# Patient Record
Sex: FEMALE | Race: WHITE | Employment: OTHER | URBAN - NONMETROPOLITAN AREA
[De-identification: names, ages, dates, MRNs, and addresses within clinical notes are randomized per-mention and may not be internally consistent; named-entity substitution may affect disease eponyms.]

---

## 2018-06-29 ENCOUNTER — HOSPITAL ENCOUNTER (OUTPATIENT)
Dept: GENERAL RADIOLOGY | Age: 43
Discharge: HOME | End: 2018-06-29
Payer: MEDICARE

## 2018-06-29 ENCOUNTER — HOSPITAL ENCOUNTER (OUTPATIENT)
Age: 43
Discharge: HOME | End: 2018-06-29
Payer: MEDICARE

## 2018-06-29 DIAGNOSIS — R52 PAIN: ICD-10-CM

## 2018-06-29 LAB
BILIRUBIN URINE: NEGATIVE
BLOOD, URINE: NEGATIVE
CHARACTER, URINE: NORMAL
COLOR: YELLOW
GLUCOSE, URINE: NEGATIVE MG/DL
KETONES, URINE: NEGATIVE
LEUKOCYTE ESTERASE, URINE: NEGATIVE
NITRITE, URINE: NEGATIVE
PH UA: 7 (ref 5–9)
PROTEIN UA: NEGATIVE MG/DL
REFLEX TO URINE C & S: NORMAL
SPECIFIC GRAVITY UA: 1.01 (ref 1–1.03)
UROBILINOGEN, URINE: 0.2 EU/DL (ref 0–1)

## 2018-06-29 PROCEDURE — 81001 URINALYSIS AUTO W/SCOPE: CPT

## 2018-06-29 PROCEDURE — 74018 RADEX ABDOMEN 1 VIEW: CPT

## 2018-08-29 ENCOUNTER — APPOINTMENT (OUTPATIENT)
Dept: GENERAL RADIOLOGY | Age: 43
End: 2018-08-29
Payer: MEDICARE

## 2018-08-29 ENCOUNTER — HOSPITAL ENCOUNTER (EMERGENCY)
Age: 43
Discharge: HOME | End: 2018-08-29
Payer: MEDICARE

## 2018-08-29 VITALS
OXYGEN SATURATION: 95 % | RESPIRATION RATE: 18 BRPM | DIASTOLIC BLOOD PRESSURE: 75 MMHG | TEMPERATURE: 99.2 F | HEART RATE: 80 BPM | BODY MASS INDEX: 21.82 KG/M2 | HEIGHT: 67 IN | WEIGHT: 139 LBS | SYSTOLIC BLOOD PRESSURE: 104 MMHG

## 2018-08-29 DIAGNOSIS — G89.29 CHRONIC GENERALIZED ABDOMINAL PAIN: Primary | ICD-10-CM

## 2018-08-29 DIAGNOSIS — R10.84 CHRONIC GENERALIZED ABDOMINAL PAIN: Primary | ICD-10-CM

## 2018-08-29 LAB
ALBUMIN SERPL-MCNC: 4.3 G/DL (ref 3.5–5.1)
ALP BLD-CCNC: 151 U/L (ref 38–126)
ALT SERPL-CCNC: 13 U/L (ref 11–66)
AMPHETAMINE+METHAMPHETAMINE URINE SCREEN: NEGATIVE
ANION GAP SERPL CALCULATED.3IONS-SCNC: 14 MEQ/L (ref 8–16)
AST SERPL-CCNC: 12 U/L (ref 5–40)
BACTERIA: ABNORMAL /HPF
BARBITURATE QUANTITATIVE URINE: NEGATIVE
BASOPHILS # BLD: 0.7 %
BASOPHILS ABSOLUTE: 0 THOU/MM3 (ref 0–0.1)
BENZODIAZEPINE QUANTITATIVE URINE: POSITIVE
BILIRUB SERPL-MCNC: 0.4 MG/DL (ref 0.3–1.2)
BILIRUBIN DIRECT: < 0.2 MG/DL (ref 0–0.3)
BILIRUBIN URINE: ABNORMAL
BLOOD, URINE: NEGATIVE
BUN BLDV-MCNC: 7 MG/DL (ref 7–22)
CALCIUM SERPL-MCNC: 8.8 MG/DL (ref 8.5–10.5)
CANNABINOID QUANTITATIVE URINE: POSITIVE
CASTS 2: ABNORMAL /LPF
CASTS UA: ABNORMAL /LPF
CHARACTER, URINE: ABNORMAL
CHLORIDE BLD-SCNC: 100 MEQ/L (ref 98–111)
CO2: 22 MEQ/L (ref 23–33)
COCAINE METABOLITE QUANTITATIVE URINE: NEGATIVE
COLOR: ABNORMAL
CREAT SERPL-MCNC: 1 MG/DL (ref 0.4–1.2)
CRYSTALS, UA: ABNORMAL
EOSINOPHIL # BLD: 1.3 %
EOSINOPHILS ABSOLUTE: 0.1 THOU/MM3 (ref 0–0.4)
EPITHELIAL CELLS, UA: ABNORMAL /HPF
ERYTHROCYTE [DISTWIDTH] IN BLOOD BY AUTOMATED COUNT: 14.5 % (ref 11.5–14.5)
ERYTHROCYTE [DISTWIDTH] IN BLOOD BY AUTOMATED COUNT: 46.5 FL (ref 35–45)
GFR SERPL CREATININE-BSD FRML MDRD: 60 ML/MIN/1.73M2
GLUCOSE BLD-MCNC: 95 MG/DL (ref 70–108)
GLUCOSE URINE: NEGATIVE MG/DL
HCT VFR BLD CALC: 43 % (ref 37–47)
HEMOGLOBIN: 14.8 GM/DL (ref 12–16)
ICTOTEST: NEGATIVE
IMMATURE GRANS (ABS): 0.02 THOU/MM3 (ref 0–0.07)
IMMATURE GRANULOCYTES: 0.3 %
KETONES, URINE: NEGATIVE
LEUKOCYTE ESTERASE, URINE: ABNORMAL
LIPASE: 9 U/L (ref 5.6–51.3)
LYMPHOCYTES # BLD: 34.7 %
LYMPHOCYTES ABSOLUTE: 2.3 THOU/MM3 (ref 1–4.8)
MCH RBC QN AUTO: 30.7 PG (ref 26–33)
MCHC RBC AUTO-ENTMCNC: 34.4 GM/DL (ref 32.2–35.5)
MCV RBC AUTO: 89.2 FL (ref 81–99)
MISCELLANEOUS 2: ABNORMAL
MONOCYTES # BLD: 5.7 %
MONOCYTES ABSOLUTE: 0.4 THOU/MM3 (ref 0.4–1.3)
NITRITE, URINE: NEGATIVE
NUCLEATED RED BLOOD CELLS: 0 /100 WBC
OPIATES, URINE: NEGATIVE
OSMOLALITY CALCULATION: 269.7 MOSMOL/KG (ref 275–300)
OXYCODONE: NEGATIVE
PH UA: 5.5
PHENCYCLIDINE QUANTITATIVE URINE: NEGATIVE
PLATELET # BLD: 241 THOU/MM3 (ref 130–400)
PMV BLD AUTO: 9.6 FL (ref 9.4–12.4)
POTASSIUM SERPL-SCNC: 3.6 MEQ/L (ref 3.5–5.2)
PROTEIN UA: ABNORMAL
RBC # BLD: 4.82 MILL/MM3 (ref 4.2–5.4)
RBC URINE: ABNORMAL /HPF
RENAL EPITHELIAL, UA: ABNORMAL
SEG NEUTROPHILS: 57.3 %
SEGMENTED NEUTROPHILS ABSOLUTE COUNT: 3.8 THOU/MM3 (ref 1.8–7.7)
SODIUM BLD-SCNC: 136 MEQ/L (ref 135–145)
SPECIFIC GRAVITY, URINE: 1.03 (ref 1–1.03)
TOTAL PROTEIN: 7.2 G/DL (ref 6.1–8)
UROBILINOGEN, URINE: 1 EU/DL
WBC # BLD: 6.7 THOU/MM3 (ref 4.8–10.8)
WBC UA: ABNORMAL /HPF
YEAST: ABNORMAL

## 2018-08-29 PROCEDURE — 87077 CULTURE AEROBIC IDENTIFY: CPT

## 2018-08-29 PROCEDURE — 83690 ASSAY OF LIPASE: CPT

## 2018-08-29 PROCEDURE — 36415 COLL VENOUS BLD VENIPUNCTURE: CPT

## 2018-08-29 PROCEDURE — 87184 SC STD DISK METHOD PER PLATE: CPT

## 2018-08-29 PROCEDURE — 74018 RADEX ABDOMEN 1 VIEW: CPT

## 2018-08-29 PROCEDURE — 82248 BILIRUBIN DIRECT: CPT

## 2018-08-29 PROCEDURE — 99284 EMERGENCY DEPT VISIT MOD MDM: CPT

## 2018-08-29 PROCEDURE — 6360000002 HC RX W HCPCS: Performed by: EMERGENCY MEDICINE

## 2018-08-29 PROCEDURE — 80053 COMPREHEN METABOLIC PANEL: CPT

## 2018-08-29 PROCEDURE — 87186 SC STD MICRODIL/AGAR DIL: CPT

## 2018-08-29 PROCEDURE — 96374 THER/PROPH/DIAG INJ IV PUSH: CPT

## 2018-08-29 PROCEDURE — 87086 URINE CULTURE/COLONY COUNT: CPT

## 2018-08-29 PROCEDURE — 81001 URINALYSIS AUTO W/SCOPE: CPT

## 2018-08-29 PROCEDURE — 85025 COMPLETE CBC W/AUTO DIFF WBC: CPT

## 2018-08-29 PROCEDURE — 80307 DRUG TEST PRSMV CHEM ANLYZR: CPT

## 2018-08-29 RX ORDER — MONTELUKAST SODIUM 10 MG/1
10 TABLET ORAL NIGHTLY
COMMUNITY
End: 2020-09-24

## 2018-08-29 RX ORDER — ONDANSETRON 4 MG/1
4 TABLET, ORALLY DISINTEGRATING ORAL ONCE
Status: COMPLETED | OUTPATIENT
Start: 2018-08-29 | End: 2018-08-29

## 2018-08-29 RX ORDER — TIZANIDINE 4 MG/1
4 TABLET ORAL EVERY 6 HOURS PRN
COMMUNITY
End: 2020-09-24

## 2018-08-29 RX ORDER — NORTRIPTYLINE HYDROCHLORIDE 10 MG/5ML
SOLUTION ORAL NIGHTLY
COMMUNITY
End: 2020-12-30 | Stop reason: ALTCHOICE

## 2018-08-29 RX ORDER — FENTANYL CITRATE 50 UG/ML
100 INJECTION, SOLUTION INTRAMUSCULAR; INTRAVENOUS ONCE
Status: COMPLETED | OUTPATIENT
Start: 2018-08-29 | End: 2018-08-29

## 2018-08-29 RX ORDER — OMEPRAZOLE 20 MG/1
40 CAPSULE, DELAYED RELEASE ORAL DAILY
COMMUNITY
End: 2020-09-24

## 2018-08-29 RX ORDER — HYOSCYAMINE SULFATE 0.125 MG
125 TABLET ORAL EVERY 4 HOURS PRN
COMMUNITY
End: 2020-12-15 | Stop reason: SDUPTHER

## 2018-08-29 RX ADMIN — FENTANYL CITRATE 100 MCG: 50 INJECTION INTRAMUSCULAR; INTRAVENOUS at 18:14

## 2018-08-29 RX ADMIN — ONDANSETRON 4 MG: 4 TABLET, ORALLY DISINTEGRATING ORAL at 18:14

## 2018-08-29 ASSESSMENT — ENCOUNTER SYMPTOMS
DIARRHEA: 0
NAUSEA: 0
SHORTNESS OF BREATH: 0
VOMITING: 1
SORE THROAT: 0
EYE PAIN: 0
EYE DISCHARGE: 0
COUGH: 0
ABDOMINAL PAIN: 1
WHEEZING: 0
BACK PAIN: 0
RHINORRHEA: 0

## 2018-08-29 ASSESSMENT — PAIN DESCRIPTION - PAIN TYPE
TYPE: ACUTE PAIN
TYPE: ACUTE PAIN;CHRONIC PAIN

## 2018-08-29 ASSESSMENT — PAIN DESCRIPTION - LOCATION
LOCATION: ABDOMEN
LOCATION: ABDOMEN

## 2018-08-29 ASSESSMENT — PAIN DESCRIPTION - FREQUENCY: FREQUENCY: CONTINUOUS

## 2018-08-29 ASSESSMENT — PAIN DESCRIPTION - DESCRIPTORS: DESCRIPTORS: CRAMPING

## 2018-08-29 ASSESSMENT — PAIN SCALES - GENERAL
PAINLEVEL_OUTOF10: 9
PAINLEVEL_OUTOF10: 5
PAINLEVEL_OUTOF10: 8

## 2018-08-29 NOTE — ED TRIAGE NOTES
Patient presents to room 32 with complaints of abdominal pain and emesis over the past few days. Patient stated she was at her pain specialist in [facility] and was advised to go to the ED for further evaluation and treatment of symptoms. Patient stated she has a history of abdominal pain and had a colonoscopy yesterday.

## 2018-08-29 NOTE — ED PROVIDER NOTES
[facility]  eMERGENCY dEPARTMENT eNCOUnter          CHIEF COMPLAINT       Chief Complaint   Patient presents with    Abdominal Pain    Emesis       Nurses Notes reviewed and I agree except as noted in the HPI. HISTORY OF PRESENT ILLNESS    Cheryl Brewer is a 37 y.o. female who presents to the Emergency Department for the evaluation of abdominal pain and emesis. She reports that she has a history of Cyclic vomiting syndrome and gastroparesis for which she has been on pain management. She states that she is normally treated with Zofran, Phenergan, and Dilaudid. She also states that she had a colonoscopy yesterday by Dr. Gini Dunham (Nenita Ends had a few polyps removed. She states that this feels like her normal abdominal pain but worse. She rates her pain at a 8/10 in severity and cramping in character. She denies the use of drugs, alcohol, or tobacco. She denies any fevers, chills, chest pain, or shortness of breath. Patient denies any further complaints at initial encounter. The HPI was provided by the patient. REVIEW OF SYSTEMS     Review of Systems   Constitutional: Negative for appetite change, chills, fatigue and fever. HENT: Negative for congestion, ear pain, rhinorrhea and sore throat. Eyes: Negative for pain, discharge and visual disturbance. Respiratory: Negative for cough, shortness of breath and wheezing. Cardiovascular: Negative for chest pain, palpitations and leg swelling. Gastrointestinal: Positive for abdominal pain and vomiting. Negative for diarrhea and nausea. Genitourinary: Negative for difficulty urinating, dysuria, hematuria and vaginal discharge. Musculoskeletal: Negative for arthralgias, back pain, joint swelling and neck pain. Skin: Negative for pallor and rash. Neurological: Negative for dizziness, syncope, weakness, light-headedness, numbness and headaches. Hematological: Negative for adenopathy.    Psychiatric/Behavioral: Negative for confusion and suicidal ideas. The patient is not nervous/anxious. PAST MEDICAL HISTORY    has a past medical history of Asthma and CVS disease. SURGICAL HISTORY      has a past surgical history that includes Cholecystectomy; Hysterectomy; and Appendectomy. CURRENT MEDICATIONS       Discharge Medication List as of 8/29/2018  8:18 PM      CONTINUE these medications which have NOT CHANGED    Details   tiZANidine (ZANAFLEX) 4 MG tablet Take 4 mg by mouth every 6 hours as neededHistorical Med      tiotropium (SPIRIVA) 18 MCG inhalation capsule Inhale 18 mcg into the lungs dailyHistorical Med      montelukast (SINGULAIR) 10 MG tablet Take 10 mg by mouth nightlyHistorical Med      omeprazole (PRILOSEC) 20 MG delayed release capsule Take 40 mg by mouth dailyHistorical Med      nortriptyline (PAMELOR) 10 MG/5ML solution Take by mouth nightlyHistorical Med      hyoscyamine (ANASPAZ;LEVSIN) 125 MCG tablet Take 125 mcg by mouth every 4 hours as needed for CrampingHistorical Med      clonazePAM (KLONOPIN) 0.5 MG tablet Take 0.5 mg by mouth 2 times daily as needed      gabapentin (NEURONTIN) 600 MG tablet Take 800 mg by mouth 3 times daily      albuterol (PROVENTIL HFA;VENTOLIN HFA) 108 (90 BASE) MCG/ACT inhaler Inhale 2 puffs into the lungs every 6 hours as needed for Wheezing      promethazine (PROMETHEGAN) 25 MG suppository Place 1 suppository rectally every 6 hours as needed for Nausea, Disp-10 suppository, R-0      ondansetron (ZOFRAN-ODT) 8 MG disintegrating tablet Take 8 mg by mouth every 8 hours as needed for Nausea or Vomiting. loperamide (IMODIUM) 2 MG capsule Take 2 mg by mouth 4 times daily as needed for Diarrhea. ALLERGIES     is allergic to nubain [nalbuphine hcl]; pcn [penicillins]; morphine; and pneumococcal vaccines. FAMILY HISTORY     indicated that her mother is alive. She indicated that her father is alive. family history is not on file.     SOCIAL HISTORY      reports that she has

## 2018-09-01 LAB
ORGANISM: ABNORMAL
URINE CULTURE REFLEX: ABNORMAL

## 2020-12-15 PROBLEM — R11.15 CYCLICAL VOMITING SYNDROME: Status: ACTIVE | Noted: 2020-12-15

## 2021-03-17 ENCOUNTER — ANESTHESIA EVENT (OUTPATIENT)
Dept: ENDOSCOPY | Age: 46
End: 2021-03-17
Payer: MEDICARE

## 2021-03-17 ENCOUNTER — ANESTHESIA (OUTPATIENT)
Dept: ENDOSCOPY | Age: 46
End: 2021-03-17
Payer: MEDICARE

## 2021-03-17 ENCOUNTER — HOSPITAL ENCOUNTER (OUTPATIENT)
Age: 46
Setting detail: OUTPATIENT SURGERY
Discharge: HOME | End: 2021-03-17
Attending: INTERNAL MEDICINE | Admitting: INTERNAL MEDICINE
Payer: MEDICARE

## 2021-03-17 VITALS
HEIGHT: 68 IN | HEART RATE: 84 BPM | RESPIRATION RATE: 18 BRPM | DIASTOLIC BLOOD PRESSURE: 84 MMHG | BODY MASS INDEX: 24.63 KG/M2 | SYSTOLIC BLOOD PRESSURE: 118 MMHG | OXYGEN SATURATION: 97 % | TEMPERATURE: 97.4 F

## 2021-03-17 VITALS
SYSTOLIC BLOOD PRESSURE: 123 MMHG | RESPIRATION RATE: 23 BRPM | OXYGEN SATURATION: 100 % | DIASTOLIC BLOOD PRESSURE: 78 MMHG

## 2021-03-17 PROCEDURE — 6360000002 HC RX W HCPCS: Performed by: INTERNAL MEDICINE

## 2021-03-17 PROCEDURE — 2580000003 HC RX 258: Performed by: INTERNAL MEDICINE

## 2021-03-17 PROCEDURE — 7100000011 HC PHASE II RECOVERY - ADDTL 15 MIN: Performed by: INTERNAL MEDICINE

## 2021-03-17 PROCEDURE — 6360000002 HC RX W HCPCS: Performed by: NURSE ANESTHETIST, CERTIFIED REGISTERED

## 2021-03-17 PROCEDURE — 2709999900 HC NON-CHARGEABLE SUPPLY: Performed by: INTERNAL MEDICINE

## 2021-03-17 PROCEDURE — 2500000003 HC RX 250 WO HCPCS: Performed by: NURSE ANESTHETIST, CERTIFIED REGISTERED

## 2021-03-17 PROCEDURE — 7100000010 HC PHASE II RECOVERY - FIRST 15 MIN: Performed by: INTERNAL MEDICINE

## 2021-03-17 PROCEDURE — 3609017100 HC EGD: Performed by: INTERNAL MEDICINE

## 2021-03-17 PROCEDURE — 3700000001 HC ADD 15 MINUTES (ANESTHESIA): Performed by: INTERNAL MEDICINE

## 2021-03-17 PROCEDURE — 3700000000 HC ANESTHESIA ATTENDED CARE: Performed by: INTERNAL MEDICINE

## 2021-03-17 RX ORDER — SODIUM CHLORIDE 450 MG/100ML
INJECTION, SOLUTION INTRAVENOUS CONTINUOUS
Status: DISCONTINUED | OUTPATIENT
Start: 2021-03-17 | End: 2021-03-17 | Stop reason: HOSPADM

## 2021-03-17 RX ORDER — SODIUM CHLORIDE 0.9 % (FLUSH) 0.9 %
10 SYRINGE (ML) INJECTION PRN
Status: DISCONTINUED | OUTPATIENT
Start: 2021-03-17 | End: 2021-03-17 | Stop reason: HOSPADM

## 2021-03-17 RX ORDER — PROPOFOL 10 MG/ML
INJECTION, EMULSION INTRAVENOUS PRN
Status: DISCONTINUED | OUTPATIENT
Start: 2021-03-17 | End: 2021-03-17 | Stop reason: SDUPTHER

## 2021-03-17 RX ORDER — VANCOMYCIN HYDROCHLORIDE 1 G/20ML
1000 INJECTION, POWDER, LYOPHILIZED, FOR SOLUTION INTRAVENOUS ONCE
Status: DISCONTINUED | OUTPATIENT
Start: 2021-03-17 | End: 2021-03-17 | Stop reason: SDUPTHER

## 2021-03-17 RX ORDER — LIDOCAINE HYDROCHLORIDE 20 MG/ML
INJECTION, SOLUTION INFILTRATION; PERINEURAL PRN
Status: DISCONTINUED | OUTPATIENT
Start: 2021-03-17 | End: 2021-03-17 | Stop reason: SDUPTHER

## 2021-03-17 RX ADMIN — SODIUM CHLORIDE: 4.5 INJECTION, SOLUTION INTRAVENOUS at 08:41

## 2021-03-17 RX ADMIN — VANCOMYCIN HYDROCHLORIDE 1000 MG: 1 INJECTION, POWDER, LYOPHILIZED, FOR SOLUTION INTRAVENOUS at 08:41

## 2021-03-17 RX ADMIN — LIDOCAINE HYDROCHLORIDE 100 MG: 20 INJECTION, SOLUTION INFILTRATION; PERINEURAL at 09:15

## 2021-03-17 RX ADMIN — VANCOMYCIN HYDROCHLORIDE 1000 MG: 1 INJECTION, POWDER, LYOPHILIZED, FOR SOLUTION INTRAVENOUS at 08:44

## 2021-03-17 RX ADMIN — PROPOFOL 180 MG: 10 INJECTION, EMULSION INTRAVENOUS at 09:15

## 2021-03-17 ASSESSMENT — PAIN - FUNCTIONAL ASSESSMENT: PAIN_FUNCTIONAL_ASSESSMENT: 0-10

## 2021-03-17 NOTE — PROGRESS NOTES
Recovery mode, denies discomfort. Taking fluids. Dr. MATT Singh 106 discussed findings with pt and . Discharge instructions provided and understanding verbalized.

## 2021-03-17 NOTE — PRE SEDATION
Sedation/Analgesia History & Physical    Patient: Darryl Medina : 1975  Med Rec#: [id number] Acc#: [id number]   Provider Performing Procedure: Jonas Moraes  Primary Care Physician: CLIFTON Hayward    PRE-PROCEDURE   Full CODE [x]Yes  DNR-CCA/DNR-CC []Yes   Brief History/Pre-Procedure Diagnosis:chronic GI bleeding           MEDICAL HISTORY    []Additional information:       has a past medical history of Anxiety, Asthma, Chronic cough, COPD (chronic obstructive pulmonary disease) ([facility] [address] CVS disease, Depression, Emphysema lung ([facility] [address] Migraines, SOB (shortness of breath), and Tattoos. SOCIAL HISTORY  Social History     Tobacco History     Smoking Status  Current Every Day Smoker Smoking Start Date  1988 Smoking Frequency  0.25 packs/day Smoking Tobacco Type  Cigarettes    Smokeless Tobacco Use  Never Used    Tobacco Comment  30 yrs  quit 4 yrs ago          Alcohol History     Alcohol Use Status  No          Drug Use     Drug Use Status  No          Sexual Activity     Sexually Active  Yes Partners  Male                FAMILY HISTORY     Family History   Problem Relation Age of Onset    Colon Cancer Neg Hx     Colon Polyps Neg Hx        SURGICAL HISTORY   has a past surgical history that includes Cholecystectomy; Hysterectomy; and Appendectomy.   Additional information:       ALLERGIES   Allergies as of 2021 - Review Complete 2021   Allergen Reaction Noted    Nubain [nalbuphine hcl] Anaphylaxis 2014    Pcn [penicillins] Anaphylaxis 2014    Morphine  2018    Pneumococcal vaccines  2018     Additional information:       MEDICATIONS   Coumadin Use Last 7 Days [x]No []Yes  Antiplatelet drug therapy use last 7 days  [x]No []Yes  Other anticoagulant use last 7 days  [x]No []Yes    Current Facility-Administered Medications:     sodium chloride flush 0.9 % injection 10 mL, 10 mL, Intravenous, PRN, Jonas Moraes MD    0.45 % sodium chloride infusion, , Intravenous, Continuous, Jack Villalpando MD, Last Rate: 75 mL/hr at 03/17/21 0841, New Bag at 03/17/21 0841    vancomycin 1000 mg IVPB in 250 mL D5W addavial, 1,000 mg, Intravenous, Once, Jack Villalpando MD, Last Rate: 250 mL/hr at 03/17/21 0841, 1,000 mg at 03/17/21 0841  Prior to Admission medications    Medication Sig Start Date End Date Taking? Authorizing Provider   SYMBICORT 80-4.5 MCG/ACT AERO inhale 2 puffs by mouth twice a day 1/26/21  Yes Historical Provider, MD   SPIRIVA RESPIMAT 2.5 MCG/ACT AERS inhaler inhale 2 puffs by mouth and INTO THE LUNGS once daily 1/26/21  Yes Historical Provider, MD   nortriptyline (PAMELOR) 25 MG capsule Take 3 capsules by mouth nightly 12/30/20 3/30/21 Yes CLIFTON Sanchez CNP   ondansetron (ZOFRAN) 4 MG tablet  12/9/20  Yes Historical Provider, MD   benztropine (COGENTIN) 1 MG tablet Take 1 mg by mouth 2 times daily   Yes Historical Provider, MD   folic acid (FOLVITE) 1 MG tablet Take 1 mg by mouth daily   Yes Historical Provider, MD   guaiFENesin (MUCINEX PO) Take by mouth   Yes Historical Provider, MD   clonazePAM (KLONOPIN) 0.5 MG tablet Take 0.5 mg by mouth 2 times daily as needed 7/21/15  Yes Historical Provider, MD   albuterol (PROVENTIL HFA;VENTOLIN HFA) 108 (90 BASE) MCG/ACT inhaler Inhale 2 puffs into the lungs every 6 hours as needed for Wheezing   Yes Historical Provider, MD   chlorhexidine (PERIDEX) 0.12 % solution Rinse with 1/2 ounce by mouth for 30 seconds then spit out.  use twice a day 2/14/21   Historical Provider, MD   nortriptyline (PAMELOR) 10 MG capsule Take 1 capsule by mouth daily 12/30/20   CLIFTON Sanchez CNP   hyoscyamine (ANASPAZ;LEVSIN) 125 MCG tablet Take 1 tablet by mouth every 4 hours as needed for Cramping or Diarrhea 12/15/20 3/15/21  Olamide Longo APRN - CNP   polyethylene glycol (MIRALAX) 17 g PACK packet Take 17 g by mouth as needed Pt using 1/2 cap as needed    Historical Provider, MD   aspirin 81 MG EC tablet Take 81 mg by mouth daily    Historical Provider, MD   warfarin (COUMADIN) 7.5 MG tablet Take 7.5 mg by mouth 10 mg on Sun & Wed  15 mg the rest of the week    Historical Provider, MD   gabapentin (NEURONTIN) 600 MG tablet Take 800 mg by mouth 3 times daily 6/14/15   Historical Provider, MD     Additional information:       PHYSICAL:   Heart:  [x]Regular rate and rhythm  []Other:    Lungs:  [x]Clear    []Other:    Abdomen: [x]Soft    []Other:    Mental Status: [x]Alert & Oriented  []Other:        PLANNED PROCEDURE   [x]EGD  []Colonoscopy []Flex Sigmoid     Consent: I have discussed with the patient and/or the patient representative the indication, alternatives, and the possible risks and/or complications of the planned procedure and the anesthesia methods. The patient and/or patient representative appear to understand and agree to proceed. SEDATION  Please see anesthesia note. The medication Planned :  Planned agent:[x]Midazolam []Meperidine [x]Sublimaze []Morphine  []Diazepam  [x]Propofol     Airway Assessment:   See anesthesia no please     Monitoring and Safety: The patient will be placed on a cardiac monitor and vital signs, pulse oximetry and level of consciousness will be continuously evaluated throughout the procedure. The patient will be closely monitored until recovery from the medications is complete and the patient has returned to baseline status. Respiratory therapy will be on standby during the procedure. [x]Pre-procedure diagnostic studies complete and results available. Comment:    [x]Previous sedation/anesthesia experiences assessed. Comment:    [x]The patient is an appropriate candidate to undergo the planned procedure sedation and anesthesia. (Refer to nursing sedation/analgesia documentation record)  [x]Formulation and discussion of sedation/procedure plan, risks, and expectations with patient and/or responsible adult completed. [x]Patient examined immediately prior to the procedure.  (Refer to nursing sedation/analgesia documentation record)    Rock Jerry MD   Electronically signed

## 2021-03-17 NOTE — ANESTHESIA PRE PROCEDURE
Department of Anesthesiology  Preprocedure Note       Name:  Dona Jason   Age:  39 y.o.  :  1975                                          MRN:  [id number]         Date:  3/17/2021      Surgeon: Moise Lange):  Wayne Arana MD    Procedure: Procedure(s):  EGD ESOPHAGOGASTRODUODENOSCOPY    Medications prior to admission:   Prior to Admission medications    Medication Sig Start Date End Date Taking? Authorizing Provider   SYMBICORT 80-4.5 MCG/ACT AERO inhale 2 puffs by mouth twice a day 21  Yes Historical Provider, MD   SPIRIVA RESPIMAT 2.5 MCG/ACT AERS inhaler inhale 2 puffs by mouth and INTO THE LUNGS once daily 21  Yes Historical Provider, MD   nortriptyline (PAMELOR) 25 MG capsule Take 3 capsules by mouth nightly 12/30/20 3/30/21 Yes Maggy Bar APRN - CNP   ondansetron (ZOFRAN) 4 MG tablet  20  Yes Historical Provider, MD   benztropine (COGENTIN) 1 MG tablet Take 1 mg by mouth 2 times daily   Yes Historical Provider, MD   folic acid (FOLVITE) 1 MG tablet Take 1 mg by mouth daily   Yes Historical Provider, MD   guaiFENesin (MUCINEX PO) Take by mouth   Yes Historical Provider, MD   clonazePAM (KLONOPIN) 0.5 MG tablet Take 0.5 mg by mouth 2 times daily as needed 7/21/15  Yes Historical Provider, MD   albuterol (PROVENTIL HFA;VENTOLIN HFA) 108 (90 BASE) MCG/ACT inhaler Inhale 2 puffs into the lungs every 6 hours as needed for Wheezing   Yes Historical Provider, MD   chlorhexidine (PERIDEX) 0.12 % solution Rinse with 1/2 ounce by mouth for 30 seconds then spit out.  use twice a day 21   Historical Provider, MD   nortriptyline (PAMELOR) 10 MG capsule Take 1 capsule by mouth daily 20   Maggy Bar APRN - CNP   hyoscyamine (ANASPAZ;LEVSIN) 125 MCG tablet Take 1 tablet by mouth every 4 hours as needed for Cramping or Diarrhea 12/15/20 3/15/21  Maggy Bar APRN - CNP   polyethylene glycol (MIRALAX) 17 g PACK packet Take 17 g by mouth as needed Pt using 1/2 cap as needed Historical Provider, MD   aspirin 81 MG EC tablet Take 81 mg by mouth daily    Historical Provider, MD   warfarin (COUMADIN) 7.5 MG tablet Take 7.5 mg by mouth 10 mg on Sun & Wed  15 mg the rest of the week    Historical Provider, MD   gabapentin (NEURONTIN) 600 MG tablet Take 800 mg by mouth 3 times daily 6/14/15   Historical Provider, MD       Current medications:    Current Facility-Administered Medications   Medication Dose Route Frequency Provider Last Rate Last Admin    sodium chloride flush 0.9 % injection 10 mL  10 mL Intravenous PRN Varghese Nelson MD        0.45 % sodium chloride infusion   Intravenous Continuous Varghese Nelson MD 75 mL/hr at 03/17/21 0841 New Bag at 03/17/21 0841    vancomycin 1000 mg IVPB in 250 mL D5W addavial  1,000 mg Intravenous Once Varghese Nelson  mL/hr at 03/17/21 0841 1,000 mg at 03/17/21 0841       Allergies: Allergies   Allergen Reactions    Nubain [Nalbuphine Hcl] Anaphylaxis     Has received hydrocodone, oxycodone, hydromorphone in the past with no reaction    Pcn [Penicillins] Anaphylaxis    Morphine     Pneumococcal Vaccines        Problem List:    Patient Active Problem List   Diagnosis Code    Colitis E68.7    Cyclical vomiting syndrome R11.15       Past Medical History:        Diagnosis Date    Anxiety     Asthma     Chronic cough     [facility]     CVS disease     Depression     Emphysema lung (HCC)     Migraines     SOB (shortness of breath)     Tattoos        Past Surgical History:        Procedure Laterality Date    APPENDECTOMY      CHOLECYSTECTOMY      HYSTERECTOMY         Social History:    Social History     Tobacco Use    Smoking status: Current Every Day Smoker     Packs/day: 0.25     Types: Cigarettes     Start date: 1/17/1988    Smokeless tobacco: Never Used    Tobacco comment: 30 yrs  quit 4 yrs ago   Substance Use Topics    Alcohol use:  No                                Ready to quit: Yes  Counseling given: No  Comment: 30 yrs  quit 4 yrs ago      Vital Signs (Current):   Vitals:    03/17/21 0739   BP: 111/72   Pulse: 87   Temp: 37.3 °C (99.1 °F)   TempSrc: Temporal   SpO2: 95%   Height: 5' 8\" (1.727 m)                                              BP Readings from Last 3 Encounters:   03/17/21 111/72   03/02/21 118/82   02/02/21 126/84       NPO Status: Time of last liquid consumption: 2130                        Time of last solid consumption: 1815                        Date of last liquid consumption: 03/16/21                        Date of last solid food consumption: 03/16/21    BMI:   Wt Readings from Last 3 Encounters:   03/02/21 162 lb (73.5 kg)   02/02/21 156 lb (70.8 kg)   12/15/20 143 lb (64.9 kg)     Body mass index is 24.63 kg/m². CBC:   Lab Results   Component Value Date    WBC 6.7 08/29/2018    RBC 4.82 08/29/2018    HGB 14.8 08/29/2018    HCT 43.0 08/29/2018    MCV 89.2 08/29/2018    RDW 13.3 08/04/2015     08/29/2018       CMP:   Lab Results   Component Value Date     08/29/2018    K 3.6 08/29/2018     08/29/2018    CO2 22 08/29/2018    BUN 7 08/29/2018    CREATININE 1.0 08/29/2018    LABGLOM 60 08/29/2018    GLUCOSE 95 08/29/2018    PROT 7.2 08/29/2018    CALCIUM 8.8 08/29/2018    BILITOT 0.4 08/29/2018    ALKPHOS 151 08/29/2018    AST 12 08/29/2018    ALT 13 08/29/2018       POC Tests: No results for input(s): POCGLU, POCNA, POCK, POCCL, POCBUN, POCHEMO, POCHCT in the last 72 hours.     Coags:   Lab Results   Component Value Date    PROTIME 12.0 10/19/2020    INR 1.04 10/19/2020       HCG (If Applicable): No results found for: PREGTESTUR, PREGSERUM, HCG, HCGQUANT     ABGs: No results found for: PHART, PO2ART, HYV6HOX, BLJ3ALF, BEART, G9TZEGRK     Type & Screen (If Applicable):  No results found for: LABABO, LABRH    Drug/Infectious Status (If Applicable):  No results found for: HIV, HEPCAB    COVID-19 Screening (If Applicable): No results found for: COVID19 Anesthesia Evaluation  Patient summary reviewed and Nursing notes reviewed  Airway: Mallampati: II        Dental: normal exam         Pulmonary:normal exam                               Cardiovascular:    (+) valvular problems/murmurs: AS,                   Neuro/Psych:               GI/Hepatic/Renal:   (+) hiatal hernia,           Endo/Other: Negative Endo/Other ROS                    Abdominal:           Vascular:                                        Anesthesia Plan      MAC     ASA 3       Induction: intravenous. Anesthetic plan and risks discussed with patient. Plan discussed with CRNA.     Attending anesthesiologist reviewed and agrees with Pre Eval content              CLIFTON Keen - CRNA   3/17/2021

## 2021-03-17 NOTE — BRIEF OP NOTE
Brief Postoperative Note      Patient: Saundra Reilly  YOB: 1975  MRN: [id number]    Date of Procedure: 3/17/2021    Pre-Op Diagnosis: IRON DEFICIENCY ANEMIA, CYCLICAL VOMITING SYNDROME, PERIUMBILICAL ABDOMINAL PAIN    Post-Op Diagnosis:  Normal EGD        Procedure(s):  EGD ESOPHAGOGASTRODUODENOSCOPY    Surgeon(s):  Sheryl Drake MD    Assistant:  * No surgical staff found *    Anesthesia: Monitor Anesthesia Care    Estimated Blood Loss (mL): none    Complications: None    Specimens:   * No specimens in log *    Implants:  * No implants in log *      Drains: * No LDAs found *    Findings:  Normal EGD     Electronically signed by Sheryl Drake MD on 3/17/2021 at 9:31 AM

## 2021-03-17 NOTE — PROGRESS NOTES
Egd performed with scope #[id number], scope advanced without problem , patient tolerated well.  Specimens not taken

## 2021-11-17 ENCOUNTER — HOSPITAL ENCOUNTER (OUTPATIENT)
Age: 46
Setting detail: OUTPATIENT SURGERY
Discharge: HOME | End: 2021-11-17
Attending: INTERNAL MEDICINE | Admitting: INTERNAL MEDICINE
Payer: MEDICARE

## 2021-11-17 ENCOUNTER — ANESTHESIA (OUTPATIENT)
Dept: ENDOSCOPY | Age: 46
End: 2021-11-17
Payer: MEDICARE

## 2021-11-17 ENCOUNTER — ANESTHESIA EVENT (OUTPATIENT)
Dept: ENDOSCOPY | Age: 46
End: 2021-11-17
Payer: MEDICARE

## 2021-11-17 VITALS
BODY MASS INDEX: 21.98 KG/M2 | WEIGHT: 145 LBS | SYSTOLIC BLOOD PRESSURE: 109 MMHG | HEIGHT: 68 IN | OXYGEN SATURATION: 97 % | HEART RATE: 87 BPM | DIASTOLIC BLOOD PRESSURE: 66 MMHG | RESPIRATION RATE: 18 BRPM | TEMPERATURE: 96.4 F

## 2021-11-17 VITALS
SYSTOLIC BLOOD PRESSURE: 116 MMHG | OXYGEN SATURATION: 99 % | DIASTOLIC BLOOD PRESSURE: 68 MMHG | RESPIRATION RATE: 30 BRPM

## 2021-11-17 PROCEDURE — 7100000011 HC PHASE II RECOVERY - ADDTL 15 MIN: Performed by: INTERNAL MEDICINE

## 2021-11-17 PROCEDURE — 2580000003 HC RX 258: Performed by: INTERNAL MEDICINE

## 2021-11-17 PROCEDURE — 6360000002 HC RX W HCPCS: Performed by: NURSE ANESTHETIST, CERTIFIED REGISTERED

## 2021-11-17 PROCEDURE — 2720000010 HC SURG SUPPLY STERILE: Performed by: INTERNAL MEDICINE

## 2021-11-17 PROCEDURE — 3609017700 HC EGD DILATION GASTRIC/DUODENAL STRICTURE: Performed by: INTERNAL MEDICINE

## 2021-11-17 PROCEDURE — 3700000000 HC ANESTHESIA ATTENDED CARE: Performed by: INTERNAL MEDICINE

## 2021-11-17 PROCEDURE — 2500000003 HC RX 250 WO HCPCS: Performed by: NURSE ANESTHETIST, CERTIFIED REGISTERED

## 2021-11-17 PROCEDURE — 7100000010 HC PHASE II RECOVERY - FIRST 15 MIN: Performed by: INTERNAL MEDICINE

## 2021-11-17 PROCEDURE — 2500000003 HC RX 250 WO HCPCS: Performed by: INTERNAL MEDICINE

## 2021-11-17 RX ORDER — CLINDAMYCIN PHOSPHATE 600 MG/50ML
600 INJECTION INTRAVENOUS ONCE
Status: COMPLETED | OUTPATIENT
Start: 2021-11-17 | End: 2021-11-17

## 2021-11-17 RX ORDER — SODIUM CHLORIDE 0.9 % (FLUSH) 0.9 %
5-40 SYRINGE (ML) INJECTION EVERY 12 HOURS SCHEDULED
Status: DISCONTINUED | OUTPATIENT
Start: 2021-11-17 | End: 2021-11-17 | Stop reason: HOSPADM

## 2021-11-17 RX ORDER — LIDOCAINE HYDROCHLORIDE 10 MG/ML
INJECTION, SOLUTION INFILTRATION; PERINEURAL PRN
Status: DISCONTINUED | OUTPATIENT
Start: 2021-11-17 | End: 2021-11-17 | Stop reason: SDUPTHER

## 2021-11-17 RX ORDER — SODIUM CHLORIDE 0.9 % (FLUSH) 0.9 %
5-40 SYRINGE (ML) INJECTION PRN
Status: DISCONTINUED | OUTPATIENT
Start: 2021-11-17 | End: 2021-11-17 | Stop reason: HOSPADM

## 2021-11-17 RX ORDER — SODIUM CHLORIDE 9 MG/ML
25 INJECTION, SOLUTION INTRAVENOUS PRN
Status: DISCONTINUED | OUTPATIENT
Start: 2021-11-17 | End: 2021-11-17 | Stop reason: HOSPADM

## 2021-11-17 RX ADMIN — PROPOFOL 250 MG: 10 INJECTION, EMULSION INTRAVENOUS at 11:13

## 2021-11-17 RX ADMIN — SODIUM CHLORIDE 1000 ML: 9 INJECTION, SOLUTION INTRAVENOUS at 10:29

## 2021-11-17 RX ADMIN — LIDOCAINE HYDROCHLORIDE 50 MG: 10 INJECTION, SOLUTION INFILTRATION; PERINEURAL at 11:13

## 2021-11-17 RX ADMIN — CLINDAMYCIN PHOSPHATE 600 MG: 600 INJECTION, SOLUTION INTRAVENOUS at 11:16

## 2021-11-17 ASSESSMENT — ENCOUNTER SYMPTOMS
DYSPNEA ACTIVITY LEVEL: AFTER AMBULATING 1 FLIGHT OF STAIRS
SHORTNESS OF BREATH: 1

## 2021-11-17 ASSESSMENT — LIFESTYLE VARIABLES: SMOKING_STATUS: 1

## 2021-11-17 ASSESSMENT — PAIN SCALES - GENERAL: PAINLEVEL_OUTOF10: 7

## 2021-11-17 ASSESSMENT — PAIN DESCRIPTION - LOCATION: LOCATION: ABDOMEN

## 2021-11-17 ASSESSMENT — PAIN - FUNCTIONAL ASSESSMENT: PAIN_FUNCTIONAL_ASSESSMENT: 0-10

## 2021-11-17 ASSESSMENT — PAIN DESCRIPTION - PAIN TYPE: TYPE: CHRONIC PAIN

## 2021-11-17 NOTE — ANESTHESIA PRE PROCEDURE
Department of Anesthesiology  Preprocedure Note       Name:  Starla Bellamy   Age:  55 y.o.  :  1975                                          MRN:  [id number]         Date:  2021      Surgeon: Eusebio Ca):  Amaury Galvez MD    Procedure: Procedure(s):  EGD ESOPHAGOGASTRODUODENOSCOPY    Medications prior to admission:   Prior to Admission medications    Medication Sig Start Date End Date Taking?  Authorizing Provider   dicyclomine (BENTYL) 10 MG capsule Take 1 capsule by mouth 4 times daily as needed (abdominal cramping) 21  Yes CLIFTON Olea CNP   Plecanatide 3 MG TABS Take 3 mg by mouth daily Patient taking as needed   Yes Historical Provider, MD   nortriptyline (PAMELOR) 25 MG capsule Take 3 capsules by mouth nightly 21 Yes CLIFTON Olea CNP   nortriptyline (PAMELOR) 10 MG capsule Take 1 capsule by mouth every morning 21  Yes CLIFTON Olea CNP   vitamin B-12 (CYANOCOBALAMIN) 1000 MCG tablet  3/31/21  Yes Historical Provider, MD   gabapentin (NEURONTIN) 800 MG tablet  3/31/21  Yes Historical Provider, MD   lamoTRIgine (LAMICTAL) 100 MG tablet  3/30/21  Yes Historical Provider, MD   QUEtiapine (SEROQUEL) 200 MG tablet TAKE 1 TABLET BY MOUTH ONCE DAILY AT BEDTIME *DOSE DECREASE* 3/27/21  Yes Historical Provider, MD   omeprazole (PRILOSEC) 20 MG delayed release capsule Take 1 capsule by mouth every morning (before breakfast) 21  Yes CLIFTON Olea CNP   SYMBICORT 80-4.5 MCG/ACT AERO inhale 2 puffs by mouth twice a day 21  Yes Historical Provider, MD   SPIRIVA RESPIMAT 2.5 MCG/ACT AERS inhaler inhale 2 puffs by mouth and INTO THE LUNGS once daily 21  Yes Historical Provider, MD   benztropine (COGENTIN) 1 MG tablet Take 1 mg by mouth 2 times daily   Yes Historical Provider, MD   folic acid (FOLVITE) 1 MG tablet Take 1 mg by mouth daily   Yes Historical Provider, MD   guaiFENesin (MUCINEX PO) Take by mouth   Yes Historical Provider, MD clonazePAM (KLONOPIN) 0.5 MG tablet Take 0.5 mg by mouth 2 times daily as needed 7/21/15  Yes Historical Provider, MD   albuterol (PROVENTIL HFA;VENTOLIN HFA) 108 (90 BASE) MCG/ACT inhaler Inhale 2 puffs into the lungs every 6 hours as needed for Wheezing   Yes Historical Provider, MD   aspirin 81 MG EC tablet Take 81 mg by mouth daily    Historical Provider, MD   warfarin (COUMADIN) 7.5 MG tablet Take 7.5 mg by mouth 10 mg on Sun & Wed  15 mg the rest of the week    Historical Provider, MD       Current medications:    Current Facility-Administered Medications   Medication Dose Route Frequency Provider Last Rate Last Admin    sodium chloride flush 0.9 % injection 5-40 mL  5-40 mL IntraVENous 2 times per day You Saleem MD        sodium chloride flush 0.9 % injection 5-40 mL  5-40 mL IntraVENous PRN You Saleem MD        0.9 % sodium chloride infusion  25 mL IntraVENous PRN You Saleem  mL/hr at 11/17/21 1029 1,000 mL at 11/17/21 1029    clindamycin (CLEOCIN) 600 mg in dextrose 5 % 50 mL IVPB  600 mg IntraVENous Once You Saleem MD           Allergies:     Allergies   Allergen Reactions    Nubain [Nalbuphine Hcl] Anaphylaxis     Has received hydrocodone, oxycodone, hydromorphone in the past with no reaction    Pcn [Penicillins] Anaphylaxis    Morphine     Pneumococcal Vaccines        Problem List:    Patient Active Problem List   Diagnosis Code    Colitis V84.6    Cyclical vomiting syndrome R11.15       Past Medical History:        Diagnosis Date    Anxiety     Asthma     Chronic cough     COPD (chronic obstructive pulmonary disease) (HCC)     CVS disease     Depression     Emphysema lung ([facility] [address]     Migraines     SOB (shortness of breath)     Tattoos        Past Surgical History:        Procedure Laterality Date    APPENDECTOMY      CHOLECYSTECTOMY      HYSTERECTOMY      UPPER GASTROINTESTINAL ENDOSCOPY N/A 3/17/2021    EGD ESOPHAGOGASTRODUODENOSCOPY performed by You Saleem MD at [address] [facility]       Social History:    Social History     Tobacco Use    Smoking status: Current Every Day Smoker     Packs/day: 0.25     Types: Cigarettes     Start date: 1/17/1988    Smokeless tobacco: Never Used    Tobacco comment: 30 yrs  quit 4 yrs ago   Substance Use Topics    Alcohol use: No                                Ready to quit: Not Answered  Counseling given: Not Answered  Comment: 30 yrs  quit 4 yrs ago      Vital Signs (Current):   Vitals:    11/17/21 1008   BP: 123/75   Pulse: 97   Resp: 18   Temp: 36.7 °C (98 °F)   TempSrc: Temporal   SpO2: 98%   Weight: 145 lb (65.8 kg)   Height: 5' 8\" (1.727 m)                                              BP Readings from Last 3 Encounters:   11/17/21 123/75   10/26/21 129/87   08/25/21 106/75       NPO Status: Time of last liquid consumption: 0200                        Time of last solid consumption: 2100 (24 hour fast)                        Date of last liquid consumption: 11/17/21                        Date of last solid food consumption: 11/15/21    BMI:   Wt Readings from Last 3 Encounters:   11/17/21 145 lb (65.8 kg)   10/26/21 144 lb 12.8 oz (65.7 kg)   08/25/21 145 lb 8 oz (66 kg)     Body mass index is 22.05 kg/m². CBC:   Lab Results   Component Value Date    WBC 6.7 08/29/2018    RBC 4.82 08/29/2018    HGB 14.8 08/29/2018    HCT 43.0 08/29/2018    MCV 89.2 08/29/2018    RDW 13.3 08/04/2015     08/29/2018       CMP:   Lab Results   Component Value Date     08/29/2018    K 3.6 08/29/2018     08/29/2018    CO2 22 08/29/2018    BUN 7 08/29/2018    CREATININE 1.0 08/29/2018    LABGLOM 60 08/29/2018    GLUCOSE 95 08/29/2018    PROT 7.2 08/29/2018    CALCIUM 8.8 08/29/2018    BILITOT 0.4 08/29/2018    ALKPHOS 151 08/29/2018    AST 12 08/29/2018    ALT 13 08/29/2018       POC Tests: No results for input(s): POCGLU, POCNA, POCK, POCCL, POCBUN, POCHEMO, POCHCT in the last 72 hours.     Coags:   Lab Results   Component Value Date    PROTIME 12.0 10/19/2020    INR 1.04 10/19/2020       HCG (If Applicable): No results found for: PREGTESTUR, PREGSERUM, HCG, HCGQUANT     ABGs: No results found for: PHART, PO2ART, YSU9NNS, HDX0KIG, BEART, N8HZWRXL     Type & Screen (If Applicable):  No results found for: LABABO, LABRH    Drug/Infectious Status (If Applicable):  No results found for: HIV, HEPCAB    COVID-19 Screening (If Applicable): No results found for: COVID19        Anesthesia Evaluation    Airway: Mallampati: II  TM distance: >3 FB   Neck ROM: full  Mouth opening: > = 3 FB Dental:    (+) edentulous      Pulmonary: breath sounds clear to auscultation  (+) COPD:  shortness of breath:  decreased breath sounds,  asthma: current smoker          Patient did not smoke on day of surgery. ROS comment: On home O2 3l duiing the day 5l at night   Cardiovascular:  Exercise tolerance: poor (<4 METS),   (+) valvular problems/murmurs: AS, STEWART: after ambulating 1 flight of stairs, systolic click,         Rhythm: regular  Rate: normal                 ROS comment: Aortic Valve replacement dec. 2019     Neuro/Psych:   (+) headaches (pt. states \"abdominal migraines\"): migraine headaches, psychiatric history:            GI/Hepatic/Renal:            ROS comment: R/o bleeding  Possible dilatation  Cyclical Vomiting syndrome. Endo/Other: Negative Endo/Other ROS             Pt had no PAT visit       Abdominal:             Vascular: Other Findings:           Anesthesia Plan      MAC     ASA 3       Induction: intravenous. Anesthetic plan and risks discussed with patient. Plan discussed with attending.                   CLIFTON Rondon - JORGE L   11/17/2021

## 2021-11-17 NOTE — H&P
Sedation/Analgesia History & Physical    Patient: Ava Shone : 1975  Med Rec#: [id number] Acc#: [id number]   Provider Performing Procedure: Arielle Kulkarni MD  Primary Care Physician: Ziggy Negro MD    PRE-PROCEDURE   Full CODE [x]Yes  DNR-CCA/DNR-CC []Yes   Brief History/Pre-Procedure Diagnosis:    History of hematemesis  ESOPHAGOSCOPY / EGD      clindamycin (CLEOCIN) infusion   2. Cyclical vomiting syndrome  ESOPHAGOSCOPY / EGD     clindamycin (CLEOCIN) infusion   3. Irritable bowel syndrome with constipation      4. History of mechanical aortic valve replacement  ESOPHAGOSCOPY / EGD     Protime-INR     clindamycin (CLEOCIN) infusion   5. Anticoagulated on Coumadin  Protime-INR               MEDICAL HISTORY    []Additional information:       has a past medical history of Anxiety, Asthma, Chronic cough, COPD (chronic obstructive pulmonary disease) ([facility] [address] CVS disease, Depression, Emphysema lung ([facility] [address] Migraines, SOB (shortness of breath), and Tattoos. SOCIAL HISTORY  Social History     Tobacco History     Smoking Status  Current Every Day Smoker Smoking Start Date  1988 Smoking Frequency  0.25 packs/day Smoking Tobacco Type  Cigarettes    Smokeless Tobacco Use  Never Used    Tobacco Comment  30 yrs  quit 4 yrs ago          Alcohol History     Alcohol Use Status  No          Drug Use     Drug Use Status  No          Sexual Activity     Sexually Active  Yes Partners  Male                FAMILY HISTORY     Family History   Problem Relation Age of Onset    Colon Cancer Neg Hx     Colon Polyps Neg Hx        SURGICAL HISTORY   has a past surgical history that includes Cholecystectomy; Hysterectomy; Appendectomy; and Upper gastrointestinal endoscopy (N/A, 3/17/2021).   Additional information:       ALLERGIES   Allergies as of 10/26/2021 - Fully Reviewed 10/26/2021   Allergen Reaction Noted    Nubain [nalbuphine hcl] Anaphylaxis 2014    Pcn [penicillins] Anaphylaxis 2014    Morphine 08/29/2018    Pneumococcal vaccines  08/29/2018     Additional information:       MEDICATIONS   Coumadin Use Last 7 Days [x]No []Yes  Antiplatelet drug therapy use last 7 days  [x]No []Yes  Other anticoagulant use last 7 days  [x]No []Yes    Current Facility-Administered Medications:     sodium chloride flush 0.9 % injection 5-40 mL, 5-40 mL, IntraVENous, 2 times per day, Mehdi Mccracken MD    sodium chloride flush 0.9 % injection 5-40 mL, 5-40 mL, IntraVENous, PRN, Mehdi Mccracken MD    0.9 % sodium chloride infusion, 25 mL, IntraVENous, PRN, Mehdi Mccracken MD, Last Rate: 100 mL/hr at 11/17/21 1029, 1,000 mL at 11/17/21 1029  Prior to Admission medications    Medication Sig Start Date End Date Taking?  Authorizing Provider   dicyclomine (BENTYL) 10 MG capsule Take 1 capsule by mouth 4 times daily as needed (abdominal cramping) 9/20/21  Yes CLIFTON Garcia CNP   Plecanatide 3 MG TABS Take 3 mg by mouth daily Patient taking as needed   Yes Historical Provider, MD   nortriptyline (PAMELOR) 25 MG capsule Take 3 capsules by mouth nightly 8/25/21 11/23/21 Yes CLIFTON Garcia CNP   nortriptyline (PAMELOR) 10 MG capsule Take 1 capsule by mouth every morning 8/25/21  Yes CLIFTON Garcia CNP   vitamin B-12 (CYANOCOBALAMIN) 1000 MCG tablet  3/31/21  Yes Historical Provider, MD   gabapentin (NEURONTIN) 800 MG tablet  3/31/21  Yes Historical Provider, MD   lamoTRIgine (LAMICTAL) 100 MG tablet  3/30/21  Yes Historical Provider, MD   QUEtiapine (SEROQUEL) 200 MG tablet TAKE 1 TABLET BY MOUTH ONCE DAILY AT BEDTIME *DOSE DECREASE* 3/27/21  Yes Historical Provider, MD   omeprazole (PRILOSEC) 20 MG delayed release capsule Take 1 capsule by mouth every morning (before breakfast) 4/6/21  Yes CLIFTON Garcia CNP   SYMBICORT 80-4.5 MCG/ACT AERO inhale 2 puffs by mouth twice a day 1/26/21  Yes Historical Provider, MD   SPIRIVA RESPIMAT 2.5 MCG/ACT AERS inhaler inhale 2 puffs by mouth and INTO THE LUNGS once daily 1/26/21  Yes Historical Provider, MD   benztropine (COGENTIN) 1 MG tablet Take 1 mg by mouth 2 times daily   Yes Historical Provider, MD   folic acid (FOLVITE) 1 MG tablet Take 1 mg by mouth daily   Yes Historical Provider, MD   guaiFENesin (MUCINEX PO) Take by mouth   Yes Historical Provider, MD   clonazePAM (KLONOPIN) 0.5 MG tablet Take 0.5 mg by mouth 2 times daily as needed 7/21/15  Yes Historical Provider, MD   albuterol (PROVENTIL HFA;VENTOLIN HFA) 108 (90 BASE) MCG/ACT inhaler Inhale 2 puffs into the lungs every 6 hours as needed for Wheezing   Yes Historical Provider, MD   aspirin 81 MG EC tablet Take 81 mg by mouth daily    Historical Provider, MD   warfarin (COUMADIN) 7.5 MG tablet Take 7.5 mg by mouth 10 mg on Sun & Wed  15 mg the rest of the week    Historical Provider, MD     Additional information:       PHYSICAL:   Heart:  [x]Regular rate and rhythm  []Other:    Lungs:  [x]Clear    []Other:    Abdomen: [x]Soft    []Other:    Mental Status: [x]Alert & Oriented  []Other:        PLANNED PROCEDURE   [x]EGD  []Colonoscopy []Flex Sigmoid     Consent: I have discussed with the patient and/or the patient representative the indication, alternatives, and the possible risks and/or complications of the planned procedure and the anesthesia methods. The patient and/or patient representative appear to understand and agree to proceed. SEDATION  Please see anesthesia note. The medication Planned :  Planned agent:[x]Midazolam []Meperidine [x]Sublimaze []Morphine  []Diazepam  [x]Propofol     Airway Assessment:   See anesthesia no please     Monitoring and Safety: The patient will be placed on a cardiac monitor and vital signs, pulse oximetry and level of consciousness will be continuously evaluated throughout the procedure. The patient will be closely monitored until recovery from the medications is complete and the patient has returned to baseline status.   Respiratory therapy will be on standby during the procedure. [x]Pre-procedure diagnostic studies complete and results available. Comment:    [x]Previous sedation/anesthesia experiences assessed. Comment:    [x]The patient is an appropriate candidate to undergo the planned procedure sedation and anesthesia. (Refer to nursing sedation/analgesia documentation record)  [x]Formulation and discussion of sedation/procedure plan, risks, and expectations with patient and/or responsible adult completed. [x]Patient examined immediately prior to the procedure.  (Refer to nursing sedation/analgesia documentation record)    Lisa Drake MD, MD   Electronically signed

## 2021-11-17 NOTE — BRIEF OP NOTE
Brief Postoperative Note      Patient: Jorge L Mixon  YOB: 1975  MRN: [id number]    Date of Procedure: 11/17/2021    Pre-Op Diagnosis: HISTORY OF HEMATEMESIS, CYCLICAL VOMITING SYNDROME, HISTORY OF MECHANICAL AORTI VALVE REPLACEMENT and intermittent dysphagia    Post-Op Diagnosis: Mild non clinically significant gastritis patient will dilation for dysphagia        Procedure(s):  EGD ESOPHAGOGASTRODUODENOSCOPY DILATATION    Surgeon(s):  Dveonte Dick MD    Assistant:  * No surgical staff found *    Anesthesia: Monitor Anesthesia Care    Estimated Blood Loss (mL): None    Complications: None    Specimens:   * No specimens in log *    Implants:  * No implants in log *      Drains: * No LDAs found *    Findings:  Mild non clinically significant gastritis patient will dilation for dysphagia    Electronically signed by Devonte Dick MD on 11/17/2021 at 11:23 AM

## 2021-11-17 NOTE — PROGRESS NOTES
EGD completed, tolerated well. Dilation complete. 45, 48, 51 FR American Dilator used. Guide wire spring intact. Scope  used.

## 2021-11-18 NOTE — OP NOTE
[address] [place] [address]                                OPERATIVE REPORT    PATIENT NAME: Wong GUTIERREZ                    :        1975  MED REC NO:   [id number]                           ROOM:  ACCOUNT NO:   [de-identified]                           ADMIT DATE: 2021  PROVIDER:     Merlinda Sers, M.D. Dartha Gleason OF PROCEDURE:  2021    SURGEON:  Merlinda Sers, MD    INDICATION:  The patient has cyclic vomit syndrome with history of  hematemesis, also on occasion difficulty to swallow. Plan today for  upper endoscopy to evaluate with possible dilation. ASA CLASSIFICATION:  III. ESTIMATED BLOOD LOSS:  None. DESCRIPTION OF PROCEDURE:  The patient was brought to the GI lab. Consent was obtained. Risks involved with the procedure were explained  to the patient. Informed consent was obtained. The patient was  monitored during the procedure with pulse oximetry, blood pressure  monitoring, oxygen by nasal cannula. Sedation by incremental doses of  IV propofol given by the Anesthesia Service to achieve total IV  anesthesia. For ASA classification and medication given during the  procedure, please see Anesthesia note. PROCEDURE PERFORMED:  EGD with dilation. A standard video upper scope 190 advanced under direct vision from the  oral cavity up to the duodenum. Esophagus appeared normal.  No erosions  or well-defined stricture seen. I elected to dilate the patient as the  patient complained of occasional dysphagia. Scope was advanced to the  stomach. Retroflex examination of the cardia revealed normal cardia. Gastric mucosa of fundus and body appeared normal.  Antrum appeared  normal.  Pylorus appeared normal.  No vascular malformation, erosion or  ulceration seen. Duodenum appears normal.  Scope withdrawn to the  antrum. Guidewire was introduced. Scope was withdrawn.   Then, American  dilator starting from size 45-Gibraltarian until 51-Gibraltarian introduced over the  guidewire, led to dilation of the esophagus. Dilator was withdrawn. Scope was advanced again, inspected the site of dilation; seen no  bleeding, no perforations, no complications. Procedure was terminated  with no immediate complications. IMPRESSION:  Feature of acid reflux as well as the patient with  dysphagia, successful dilation until size 51-Gibraltarian, otherwise normal  upper endoscopy. PLAN:  Follow up with GI clinic for evaluation. Avoid NSAID.         Bettie Parks M.D.    D: 11/17/2021 11:39:33       T: 11/17/2021 15:53:13     [email or web address]  Job#: [id number]     Doc#: [id number]    CC:  Perri Naranjo M.D.

## 2021-12-22 NOTE — ANESTHESIA POSTPROCEDURE EVALUATION
Department of Anesthesiology  Postprocedure Note    Patient: Starla Bellamy  MRN: [id number]  YOB: 1975  Date of evaluation: 11/17/2021  Time:  11:26 AM     Procedure Summary     Date: 11/17/21 Room / Location: [address] [place] [address]    Anesthesia Start: 0126 Anesthesia Stop: 9092    Procedure: EGD ESOPHAGOGASTRODUODENOSCOPY DILATATION (N/A ) Diagnosis: (HISTORY OF HEMATEMESIS, CYCLICAL VOMITING SYNDROME, HISTORY OF MECHANICAL AORTI VALVE REPLACEMENT)    Surgeons: Amaury Galvez MD Responsible Provider: Parmjit Joradn DO    Anesthesia Type: MAC ASA Status: 3          Anesthesia Type: MAC    Chad Phase I: Chad Score: 10    Chad Phase II:      Last vitals: Reviewed and per EMR flowsheets.        Anesthesia Post Evaluation    Patient location during evaluation: bedside  Patient participation: complete - patient participated  Level of consciousness: awake and alert  Pain score: 0  Airway patency: patent  Nausea & Vomiting: no nausea and no vomiting  Complications: no  Cardiovascular status: hemodynamically stable  Respiratory status: room air and spontaneous ventilation  Hydration status: stable Ambulatory

## 2023-10-28 NOTE — ANESTHESIA POSTPROCEDURE EVALUATION
Department of Anesthesiology  Postprocedure Note    Patient: Rakel Mccoy  MRN: [id number]  YOB: 1975  Date of evaluation: 3/17/2021  Time:  9:32 AM     Procedure Summary     Date: 03/17/21 Room / Location: [address] [place] [address]    Anesthesia Start: 8620 Anesthesia Stop: 0930    Procedure: EGD ESOPHAGOGASTRODUODENOSCOPY (N/A ) Diagnosis: (IRON DEFICIENCY ANEMIA, CYCLICAL VOMITING SYNDROME, PERIUMBILICAL ABDOMINAL PAIN)    Surgeons: Brie Guardado MD Responsible Provider: Alisa Mosher DO    Anesthesia Type: MAC ASA Status: 3          Anesthesia Type: MAC    Chad Phase I: Chad Score: 10    Chad Phase II:      Last vitals: Reviewed and per EMR flowsheets.        Anesthesia Post Evaluation    Patient location during evaluation: bedside  Patient participation: complete - patient participated  Level of consciousness: lethargic  Pain score: 0  Airway patency: patent  Nausea & Vomiting: no nausea and no vomiting  Complications: no  Cardiovascular status: blood pressure returned to baseline  Respiratory status: acceptable  Hydration status: euvolemic Discharged

## (undated) DEVICE — BIOGUARD A/W CLEANING ADAPTER

## (undated) DEVICE — GLOVE ORTHO 8   MSG9480